# Patient Record
Sex: MALE | Race: WHITE | Employment: FULL TIME | URBAN - METROPOLITAN AREA
[De-identification: names, ages, dates, MRNs, and addresses within clinical notes are randomized per-mention and may not be internally consistent; named-entity substitution may affect disease eponyms.]

---

## 2018-05-23 ENCOUNTER — HOSPITAL ENCOUNTER (EMERGENCY)
Age: 48
Discharge: HOME | End: 2018-05-23
Attending: EMERGENCY MEDICINE
Payer: COMMERCIAL

## 2018-05-23 VITALS
TEMPERATURE: 98 F | WEIGHT: 190 LBS | HEIGHT: 70 IN | BODY MASS INDEX: 27.2 KG/M2 | OXYGEN SATURATION: 98 % | RESPIRATION RATE: 20 BRPM | SYSTOLIC BLOOD PRESSURE: 156 MMHG | HEART RATE: 89 BPM | DIASTOLIC BLOOD PRESSURE: 93 MMHG

## 2018-05-23 DIAGNOSIS — S01.01XA SCALP LACERATION, INITIAL ENCOUNTER: Primary | ICD-10-CM

## 2018-05-23 PROCEDURE — 99282 EMERGENCY DEPT VISIT SF MDM: CPT

## 2018-05-24 NOTE — ED PROVIDER NOTES
Patient Seen in: [facility] In [place]    History   Patient presents with:  Laceration Abrasion (integumentary)    Stated Complaint: scalp laceration    HPI  CHIEF COMPLAINT: Scalp laceration    HISTORY OF PRESENT ILLNESS: It is a 48- Head: Not macrocephalic and not microcephalic. Head is with laceration. Head is without raccoon's eyes, without Mora's sign, without abrasion, without contusion, without right periorbital erythema and without left periorbital erythema.  Hair is normal.

## 2018-05-24 NOTE — ED PROVIDER NOTES
I reviewed that chart and discussed the case.   I have examined the patient and noted laceration of the top has had no joint erythema no foreign body sensation otherwise HEENT exam is normal lungs are microvascular normal.      I agree with the following cl

## 2019-05-05 ENCOUNTER — WALK IN (OUTPATIENT)
Dept: URGENT CARE | Age: 49
End: 2019-05-05

## 2019-05-05 VITALS
SYSTOLIC BLOOD PRESSURE: 120 MMHG | WEIGHT: 202.49 LBS | DIASTOLIC BLOOD PRESSURE: 80 MMHG | RESPIRATION RATE: 12 BRPM | TEMPERATURE: 98.3 F | HEART RATE: 80 BPM | HEIGHT: 70 IN | BODY MASS INDEX: 28.99 KG/M2

## 2019-05-05 DIAGNOSIS — J00 COMMON COLD: Primary | ICD-10-CM

## 2019-05-05 PROCEDURE — 99203 OFFICE O/P NEW LOW 30 MIN: CPT | Performed by: NURSE PRACTITIONER

## 2019-05-05 RX ORDER — PROMETHAZINE HYDROCHLORIDE 6.25 MG/5ML
5-10 SYRUP ORAL NIGHTLY PRN
Qty: 50 ML | Refills: 0 | Status: SHIPPED | OUTPATIENT
Start: 2019-05-05 | End: 2019-05-10

## 2019-05-05 RX ORDER — MULTIVITAMIN,THER AND MINERALS
1 TABLET ORAL DAILY
COMMUNITY

## 2019-05-05 RX ORDER — BENZONATATE 100 MG/1
100 CAPSULE ORAL 3 TIMES DAILY PRN
Qty: 15 CAPSULE | Refills: 0 | Status: SHIPPED | OUTPATIENT
Start: 2019-05-05 | End: 2019-05-10

## 2019-05-05 ASSESSMENT — ENCOUNTER SYMPTOMS
RHINORRHEA: 1
COUGH: 1

## 2022-02-08 PROBLEM — K21.9 GASTROESOPHAGEAL REFLUX DISEASE: Status: ACTIVE | Noted: 2022-02-08

## 2022-02-08 PROBLEM — H93.13 BILATERAL TINNITUS: Status: ACTIVE | Noted: 2022-02-08

## 2022-02-08 PROBLEM — R89.9 ABNORMAL LABORATORY TEST RESULT: Status: ACTIVE | Noted: 2022-02-08

## 2022-03-14 ENCOUNTER — LAB ENCOUNTER (OUTPATIENT)
Dept: LAB | Age: 52
End: 2022-03-14
Attending: INTERNAL MEDICINE
Payer: COMMERCIAL

## 2022-03-14 DIAGNOSIS — Z01.818 PRE-OP TESTING: ICD-10-CM

## 2022-03-15 LAB — SARS-COV-2 RNA RESP QL NAA+PROBE: NOT DETECTED

## 2022-03-17 PROBLEM — K21.9 GERD (GASTROESOPHAGEAL REFLUX DISEASE): Status: ACTIVE | Noted: 2022-03-17

## 2022-03-17 PROBLEM — K63.5 POLYP OF COLON: Status: ACTIVE | Noted: 2022-03-17

## 2022-03-17 PROBLEM — Z12.11 SPECIAL SCREENING FOR MALIGNANT NEOPLASM OF COLON: Status: ACTIVE | Noted: 2022-03-17

## 2022-03-17 PROBLEM — R13.10 DYSPHAGIA, UNSPECIFIED: Status: ACTIVE | Noted: 2022-03-17

## 2022-03-17 PROBLEM — K20.80 CORROSIVE ESOPHAGITIS: Status: ACTIVE | Noted: 2022-03-17

## 2022-05-09 ENCOUNTER — LAB ENCOUNTER (OUTPATIENT)
Dept: LAB | Age: 52
End: 2022-05-09
Attending: INTERNAL MEDICINE
Payer: COMMERCIAL

## 2022-05-09 DIAGNOSIS — Z01.818 PRE-OP TESTING: ICD-10-CM

## 2022-05-09 LAB — SARS-COV-2 RNA RESP QL NAA+PROBE: NOT DETECTED

## 2022-05-12 PROBLEM — K29.60 OTHER GASTRITIS WITHOUT BLEEDING: Status: ACTIVE | Noted: 2022-05-12

## 2022-05-12 PROBLEM — K25.9 GASTRIC ULCER: Status: ACTIVE | Noted: 2022-05-12

## 2023-06-03 ENCOUNTER — WALK IN (OUTPATIENT)
Dept: URGENT CARE | Age: 53
End: 2023-06-03

## 2023-06-03 VITALS
DIASTOLIC BLOOD PRESSURE: 90 MMHG | BODY MASS INDEX: 27.92 KG/M2 | WEIGHT: 195 LBS | OXYGEN SATURATION: 97 % | HEIGHT: 70 IN | RESPIRATION RATE: 16 BRPM | HEART RATE: 80 BPM | TEMPERATURE: 98.3 F | SYSTOLIC BLOOD PRESSURE: 138 MMHG

## 2023-06-03 DIAGNOSIS — R03.0 ELEVATED BP WITHOUT DIAGNOSIS OF HYPERTENSION: ICD-10-CM

## 2023-06-03 DIAGNOSIS — J01.90 ACUTE BACTERIAL RHINOSINUSITIS: Primary | ICD-10-CM

## 2023-06-03 DIAGNOSIS — B96.89 ACUTE BACTERIAL RHINOSINUSITIS: Primary | ICD-10-CM

## 2023-06-03 PROCEDURE — 99203 OFFICE O/P NEW LOW 30 MIN: CPT | Performed by: NURSE PRACTITIONER

## 2023-06-03 RX ORDER — DOXYCYCLINE 100 MG/1
100 CAPSULE ORAL 2 TIMES DAILY
Qty: 14 CAPSULE | Refills: 0 | Status: SHIPPED | OUTPATIENT
Start: 2023-06-03 | End: 2023-06-10

## 2023-06-03 ASSESSMENT — PAIN SCALES - GENERAL: PAINLEVEL: 2

## 2023-07-05 ENCOUNTER — WALK IN (OUTPATIENT)
Dept: URGENT CARE | Age: 53
End: 2023-07-05

## 2023-07-05 VITALS
TEMPERATURE: 97.8 F | HEART RATE: 78 BPM | SYSTOLIC BLOOD PRESSURE: 126 MMHG | DIASTOLIC BLOOD PRESSURE: 80 MMHG | OXYGEN SATURATION: 99 % | BODY MASS INDEX: 27.92 KG/M2 | WEIGHT: 195 LBS | HEIGHT: 70 IN | RESPIRATION RATE: 16 BRPM

## 2023-07-05 DIAGNOSIS — B35.4 TINEA CORPORIS: Primary | ICD-10-CM

## 2023-07-05 PROCEDURE — 99213 OFFICE O/P EST LOW 20 MIN: CPT | Performed by: NURSE PRACTITIONER

## 2023-07-05 RX ORDER — TRIAMCINOLONE ACETONIDE 1 MG/G
CREAM TOPICAL 2 TIMES DAILY
Qty: 15 G | Refills: 1 | Status: SHIPPED | OUTPATIENT
Start: 2023-07-05 | End: 2023-07-15

## 2023-07-05 RX ORDER — KETOCONAZOLE 20 MG/G
CREAM TOPICAL 2 TIMES DAILY
Qty: 15 G | Refills: 0 | Status: SHIPPED | OUTPATIENT
Start: 2023-07-05 | End: 2023-08-02

## 2023-07-05 ASSESSMENT — PAIN SCALES - GENERAL: PAINLEVEL: 0

## 2023-11-30 ENCOUNTER — WALK IN (OUTPATIENT)
Dept: URGENT CARE | Age: 53
End: 2023-11-30

## 2023-11-30 VITALS
DIASTOLIC BLOOD PRESSURE: 72 MMHG | HEIGHT: 70 IN | WEIGHT: 195 LBS | TEMPERATURE: 97.5 F | BODY MASS INDEX: 27.92 KG/M2 | SYSTOLIC BLOOD PRESSURE: 120 MMHG | RESPIRATION RATE: 20 BRPM | HEART RATE: 88 BPM

## 2023-11-30 DIAGNOSIS — J06.9 UPPER RESPIRATORY TRACT INFECTION, UNSPECIFIED TYPE: ICD-10-CM

## 2023-11-30 DIAGNOSIS — H66.91 RIGHT OTITIS MEDIA, UNSPECIFIED OTITIS MEDIA TYPE: Primary | ICD-10-CM

## 2023-11-30 PROBLEM — R13.10 DYSPHAGIA, UNSPECIFIED: Status: ACTIVE | Noted: 2022-03-17

## 2023-11-30 PROBLEM — K29.60 OTHER GASTRITIS WITHOUT BLEEDING: Status: ACTIVE | Noted: 2022-05-12

## 2023-11-30 PROBLEM — M17.31 POST-TRAUMATIC OSTEOARTHRITIS OF RIGHT KNEE: Status: ACTIVE | Noted: 2022-09-06

## 2023-11-30 PROBLEM — M19.022 PRIMARY OSTEOARTHRITIS OF LEFT ELBOW: Status: ACTIVE | Noted: 2022-09-06

## 2023-11-30 PROBLEM — G89.29 CHRONIC PAIN OF RIGHT KNEE: Status: ACTIVE | Noted: 2022-09-06

## 2023-11-30 PROBLEM — K63.5 POLYP OF COLON: Status: ACTIVE | Noted: 2022-03-17

## 2023-11-30 PROBLEM — K25.9 GASTRIC ULCER: Status: ACTIVE | Noted: 2022-05-12

## 2023-11-30 PROBLEM — M23.300 LATERAL MENISCUS DERANGEMENT, RIGHT: Status: ACTIVE | Noted: 2022-09-20

## 2023-11-30 PROBLEM — M25.561 CHRONIC PAIN OF RIGHT KNEE: Status: ACTIVE | Noted: 2022-09-06

## 2023-11-30 PROBLEM — R89.9 ABNORMAL LABORATORY TEST RESULT: Status: ACTIVE | Noted: 2022-02-08

## 2023-11-30 PROBLEM — K20.80 CORROSIVE ESOPHAGITIS: Status: ACTIVE | Noted: 2022-03-17

## 2023-11-30 PROBLEM — Z12.11 SPECIAL SCREENING FOR MALIGNANT NEOPLASM OF COLON: Status: ACTIVE | Noted: 2022-03-17

## 2023-11-30 PROCEDURE — 99214 OFFICE O/P EST MOD 30 MIN: CPT | Performed by: NURSE PRACTITIONER

## 2023-11-30 RX ORDER — MELOXICAM 15 MG/1
15 TABLET ORAL DAILY
COMMUNITY

## 2023-11-30 RX ORDER — AZITHROMYCIN 250 MG/1
TABLET, FILM COATED ORAL
Qty: 6 TABLET | Refills: 0 | Status: SHIPPED | OUTPATIENT
Start: 2023-11-30 | End: 2023-12-05

## 2025-01-06 ENCOUNTER — V-VISIT (OUTPATIENT)
Dept: URGENT CARE | Age: 55
End: 2025-01-06

## 2025-01-06 VITALS
OXYGEN SATURATION: 96 % | BODY MASS INDEX: 29.63 KG/M2 | DIASTOLIC BLOOD PRESSURE: 86 MMHG | HEIGHT: 70 IN | WEIGHT: 207 LBS | HEART RATE: 85 BPM | RESPIRATION RATE: 18 BRPM | SYSTOLIC BLOOD PRESSURE: 134 MMHG | TEMPERATURE: 98.4 F

## 2025-01-06 DIAGNOSIS — J20.9 ACUTE BRONCHITIS, UNSPECIFIED ORGANISM: Primary | ICD-10-CM

## 2025-01-06 LAB
FLUAV AG UPPER RESP QL IA.RAPID: NEGATIVE
FLUBV AG UPPER RESP QL IA.RAPID: NEGATIVE
SARS-COV+SARS-COV-2 AG RESP QL IA.RAPID: NOT DETECTED
TEST LOT EXPIRATION DATE: NORMAL
TEST LOT NUMBER: NORMAL

## 2025-01-06 RX ORDER — ALBUTEROL SULFATE 90 UG/1
INHALANT RESPIRATORY (INHALATION)
Qty: 1 EACH | Refills: 0 | Status: SHIPPED | OUTPATIENT
Start: 2025-01-06

## 2025-01-06 RX ORDER — BENZONATATE 200 MG/1
200 CAPSULE ORAL 3 TIMES DAILY PRN
Qty: 20 CAPSULE | Refills: 0 | Status: SHIPPED | OUTPATIENT
Start: 2025-01-06

## 2025-01-06 RX ORDER — LEVOCETIRIZINE DIHYDROCHLORIDE 5 MG/1
5 TABLET, FILM COATED ORAL EVERY EVENING
COMMUNITY

## 2025-01-06 RX ORDER — PREDNISONE 20 MG/1
40 TABLET ORAL 2 TIMES DAILY
Qty: 20 TABLET | Refills: 0 | Status: SHIPPED | OUTPATIENT
Start: 2025-01-06 | End: 2025-01-11

## 2025-01-06 ASSESSMENT — ENCOUNTER SYMPTOMS
RHINORRHEA: 1
EYES NEGATIVE: 1
SHORTNESS OF BREATH: 0
COUGH: 1
CHEST TIGHTNESS: 1
WHEEZING: 1
SORE THROAT: 0
CONSTITUTIONAL NEGATIVE: 1

## (undated) NOTE — ED AVS SNAPSHOT
Chirag Carballo   MRN: [id number]    Department:  [facility] in [place]   Date of Visit:  5/23/2018           Disclosure     Insurance plans vary and the physician(s) referred by the ER may not be covered by your plan.  Please c tell this physician (or your personal doctor if your instructions are to return to your personal doctor) about any new or lasting problems. The primary care or specialist physician will see patients referred from the [facility] Emergency Department.  Migel Obrien